# Patient Record
Sex: FEMALE | Race: WHITE
[De-identification: names, ages, dates, MRNs, and addresses within clinical notes are randomized per-mention and may not be internally consistent; named-entity substitution may affect disease eponyms.]

---

## 2019-01-18 ENCOUNTER — HOSPITAL ENCOUNTER (OUTPATIENT)
Dept: HOSPITAL 76 - EMS | Age: 69
Discharge: ANOTHER HOSPITAL | End: 2019-01-18
Attending: SURGERY
Payer: COMMERCIAL

## 2019-01-18 DIAGNOSIS — R10.9: Primary | ICD-10-CM

## 2019-03-27 ENCOUNTER — HOSPITAL ENCOUNTER (OUTPATIENT)
Dept: HOSPITAL 76 - DI.N | Age: 69
Discharge: HOME | End: 2019-03-27
Attending: FAMILY MEDICINE
Payer: COMMERCIAL

## 2019-03-27 DIAGNOSIS — R91.8: Primary | ICD-10-CM

## 2019-03-27 PROCEDURE — 71046 X-RAY EXAM CHEST 2 VIEWS: CPT

## 2019-03-27 NOTE — XRAY REPORT
Reason:  PULMONARY NODULES NOTED ON LAST CT

Procedure Date:  03/27/2019   

Accession Number:  [id number] / [id number]                    

Procedure:  XRN - Chest 2 View X-Ray CPT Code:  83412

 

FULL RESULT:

 

 

EXAM:

CHEST RADIOGRAPHY

 

EXAM DATE: 3/27/2019 10:51 AM.

 

CLINICAL HISTORY: Pulmonary nodules noted on last CT.

 

COMPARISON: None.

 

TECHNIQUE: 2 views.

 

FINDINGS:

Lungs/Pleura: No focal opacities evident. No pleural effusion. No 

pneumothorax. Normal volumes.

 

Mediastinum: Heart and mediastinal contours are unremarkable.

 

Other: None.

IMPRESSION: The nodules identified by CT are not definitely seen on plain 

radiograph.

 

Recommendation: General recommendations for nodules incidentally 

discovered on CT are as follows:

 

Fleischner Society Recommendations 2017

MacMahon et al. Radiology 2017

 

Solid Nodules-Low Risk Patients:

<6 mm (single or multiple) - No routine follow-up*

6-8 mm (single) -CT at 6-12 months, then consider CT at 18-24 months

6-8mm (multiple) -CT at 3-6 months, then consider at CT 18-24 months

>8 mm (single) -Consider CT, PET/CT, or tissue sampling at 3 months

>8 mm (multiple) -CT at 3-6 months, then consider CT at 18-24 months

 

Solid Nodules-High Risk Patients:

<6 mm (single or multiple) -Optional CT at 12 months*

6-8 mm (single) -CT at 6-12 months, then CT at 18-24 months

6-8mm (multiple) -CT at 3-6 months, then CT at 18-24 months

>8 mm (single) -Consider CT, PET/CT, or tissue sampling at 3 months

>8 mm (multiple) -CT at 3-6 months, then at 18-24 months

 

*Nodules < 6mm do not require routine follow-up, but suspicious nodule 

morphology, upper lobe location, or both may warrant 12 month follow-up

 

Subsolid nodules:

<6 mm (single, GG or part solid) -No routine follow-up**

>=6 mm (single GG) -CT at 6-12 months to confirm, then CT  q2 years until 

5 years

>=6 mm (single part solid) -CT at 3-6 months to confirm, if unchanged and 

solid <6mm, annual CT for 5 years

 

<6 mm (multiple GG or part solid) -CT at 3-6 months. If stable, consider 

CT at 2 and 4 years

>=6 mm (multiple GG or part solid) -CT at 3-6 months. Subsequent 

management based on most suspicious nodule(s).

 

**Consider follow-up at 2 and 4 years for certain suspicious nodules 

<6mm.  If solid component develops or growth, consider resection.

 

[facility]

## 2019-06-19 ENCOUNTER — HOSPITAL ENCOUNTER (OUTPATIENT)
Dept: HOSPITAL 76 - DI | Age: 69
Discharge: HOME | End: 2019-06-19
Attending: FAMILY MEDICINE
Payer: COMMERCIAL

## 2019-06-19 DIAGNOSIS — I35.1: ICD-10-CM

## 2019-06-19 DIAGNOSIS — I48.91: Primary | ICD-10-CM

## 2019-06-19 DIAGNOSIS — I51.7: ICD-10-CM

## 2019-06-19 PROCEDURE — 93306 TTE W/DOPPLER COMPLETE: CPT

## 2019-11-27 ENCOUNTER — HOSPITAL ENCOUNTER (OUTPATIENT)
Dept: HOSPITAL 76 - DI | Age: 69
Discharge: HOME | End: 2019-11-27
Attending: FAMILY MEDICINE
Payer: COMMERCIAL

## 2019-11-27 DIAGNOSIS — R19.7: ICD-10-CM

## 2019-11-27 DIAGNOSIS — R10.9: Primary | ICD-10-CM

## 2019-11-27 DIAGNOSIS — N28.1: ICD-10-CM

## 2019-11-27 PROCEDURE — 74176 CT ABD & PELVIS W/O CONTRAST: CPT

## 2019-11-28 NOTE — CT REPORT
Reason:  ABDOMINAL PAIN, DARRHEA

Procedure Date:  11/27/2019   

Accession Number:  [id number] / [id number]                    

Procedure:  CT  - Abdomen/Pelvis WO CPT Code:  

 

***Final Report***

 

 

FULL RESULT:

 

 

EXAM:

CT ABDOMEN AND PELVIS (CT KUB)

 

EXAM DATE: 11/27/2019 12:11 PM.

 

CLINICAL HISTORY: ABDOMINAL PAIN, DARRHEA.

 

COMPARISONS: None.

 

TECHNIQUE: Routine axial helical CT imaging was performed through the 

abdomen and pelvis without IV contrast. Reconstructions: Coronal and 

sagittal.

 

In accordance with CT protocol optimization, one or more of the following 

dose reduction techniques were utilized for this exam: automated exposure 

control, adjustment of mA and/or KV based on patient size, or use of 

iterative reconstructive technique.

 

FINDINGS:

Lung Bases: Unremarkable.

 

Right Kidney/Ureter: A 3 cm oval-shaped hypodense lesion in the inferior 

pole of right kidney, most consistent with simple cyst. No stones, 

hydronephrosis, or hydroureter. No perinephric fat stranding.

 

Left Kidney/Ureter: No stones, hydronephrosis, or hydroureter. No 

perinephric fat stranding.

 

Other Solid Organs: Noncontrast images of the solid organs are grossly 

unremarkable.

 

Gallbladder/Bile Ducts: Post cholecystectomy with clips in GB fossa.

 

Peritoneal Cavity: No free fluid, free air or mary adenopathy. Bowel is 

grossly unremarkable. Moderate colonic diverticulosis noted involving 

sigmoid colon. Appendix not well visualized.

 

Pelvic Organs: No bladder stones or wall thickening. Noncontrast images 

of the visualized pelvic organs are unremarkable.

 

Vasculature: Unremarkable.

 

Other: None.

IMPRESSION:

 

No urinary tract stones or obstruction. A 3 cm simple cyst inferior pole 

of right kidney. No other acute abdominal pathology.

 

[facility]

## 2020-02-19 ENCOUNTER — HOSPITAL ENCOUNTER (EMERGENCY)
Dept: HOSPITAL 76 - ED | Age: 70
Discharge: HOME | End: 2020-02-19
Payer: COMMERCIAL

## 2020-02-19 VITALS — DIASTOLIC BLOOD PRESSURE: 93 MMHG | SYSTOLIC BLOOD PRESSURE: 154 MMHG

## 2020-02-19 DIAGNOSIS — E87.70: ICD-10-CM

## 2020-02-19 DIAGNOSIS — I48.91: Primary | ICD-10-CM

## 2020-02-19 LAB
ALBUMIN DIAFP-MCNC: 4.1 G/DL (ref 3.2–5.5)
ALBUMIN/GLOB SERPL: 1.1 {RATIO} (ref 1–2.2)
ALP SERPL-CCNC: 96 IU/L (ref 42–121)
ALT SERPL W P-5'-P-CCNC: 29 IU/L (ref 10–60)
ANION GAP SERPL CALCULATED.4IONS-SCNC: 11 MMOL/L (ref 6–13)
AST SERPL W P-5'-P-CCNC: 26 IU/L (ref 10–42)
BASOPHILS NFR BLD AUTO: 0 10^3/UL (ref 0–0.1)
BASOPHILS NFR BLD AUTO: 0.4 %
BILIRUB BLD-MCNC: 1 MG/DL (ref 0.2–1)
BUN SERPL-MCNC: 14 MG/DL (ref 6–20)
CALCIUM UR-MCNC: 8.9 MG/DL (ref 8.5–10.3)
CHLORIDE SERPL-SCNC: 103 MMOL/L (ref 101–111)
CLARITY UR REFRACT.AUTO: CLEAR
CO2 SERPL-SCNC: 25 MMOL/L (ref 21–32)
CREAT SERPLBLD-SCNC: 1 MG/DL (ref 0.4–1)
EOSINOPHIL # BLD AUTO: 0.1 10^3/UL (ref 0–0.7)
EOSINOPHIL NFR BLD AUTO: 1.3 %
ERYTHROCYTE [DISTWIDTH] IN BLOOD BY AUTOMATED COUNT: 15 % (ref 12–15)
GFRSERPLBLD MDRD-ARVRAT: 55 ML/MIN/{1.73_M2} (ref 89–?)
GLOBULIN SER-MCNC: 3.7 G/DL (ref 2.1–4.2)
GLUCOSE SERPL-MCNC: 147 MG/DL (ref 70–100)
GLUCOSE UR QL STRIP.AUTO: NEGATIVE MG/DL
HGB UR QL STRIP: 10.8 G/DL (ref 12–16)
KETONES UR QL STRIP.AUTO: NEGATIVE MG/DL
LIPASE SERPL-CCNC: 23 U/L (ref 22–51)
LYMPHOCYTES # SPEC AUTO: 1.3 10^3/UL (ref 1.5–3.5)
LYMPHOCYTES NFR BLD AUTO: 13.8 %
MCH RBC QN AUTO: 28.6 PG (ref 27–31)
MCHC RBC AUTO-ENTMCNC: 30.7 G/DL (ref 32–36)
MCV RBC AUTO: 93.1 FL (ref 81–99)
MONOCYTES # BLD AUTO: 0.4 10^3/UL (ref 0–1)
MONOCYTES NFR BLD AUTO: 4.1 %
NEUTROPHILS # BLD AUTO: 7.4 10^3/UL (ref 1.5–6.6)
NEUTROPHILS # SNV AUTO: 9.3 X10^3/UL (ref 4.8–10.8)
NEUTROPHILS NFR BLD AUTO: 79.9 %
NITRITE UR QL STRIP.AUTO: NEGATIVE
PDW BLD AUTO: 10.2 FL (ref 7.9–10.8)
PH UR STRIP.AUTO: 6.5 PH (ref 5–7.5)
PLATELET # BLD: 374 10^3/UL (ref 130–450)
PROT SPEC-MCNC: 7.8 G/DL (ref 6.7–8.2)
PROT UR STRIP.AUTO-MCNC: 100 MG/DL
RBC # UR STRIP.AUTO: NEGATIVE /UL
RBC # URNS HPF: (no result) /HPF (ref 0–5)
RBC MAR: 3.78 10^6/UL (ref 4.2–5.4)
SODIUM SERPLBLD-SCNC: 139 MMOL/L (ref 135–145)
SP GR UR STRIP.AUTO: 1.02 (ref 1–1.03)
SQUAMOUS URNS QL MICRO: (no result)
UROBILINOGEN UR QL STRIP.AUTO: (no result) E.U./DL
UROBILINOGEN UR STRIP.AUTO-MCNC: NEGATIVE MG/DL

## 2020-02-19 PROCEDURE — 84484 ASSAY OF TROPONIN QUANT: CPT

## 2020-02-19 PROCEDURE — 93005 ELECTROCARDIOGRAM TRACING: CPT

## 2020-02-19 PROCEDURE — 96374 THER/PROPH/DIAG INJ IV PUSH: CPT

## 2020-02-19 PROCEDURE — 85025 COMPLETE CBC W/AUTO DIFF WBC: CPT

## 2020-02-19 PROCEDURE — 71045 X-RAY EXAM CHEST 1 VIEW: CPT

## 2020-02-19 PROCEDURE — 83690 ASSAY OF LIPASE: CPT

## 2020-02-19 PROCEDURE — 96376 TX/PRO/DX INJ SAME DRUG ADON: CPT

## 2020-02-19 PROCEDURE — 96375 TX/PRO/DX INJ NEW DRUG ADDON: CPT

## 2020-02-19 PROCEDURE — 99284 EMERGENCY DEPT VISIT MOD MDM: CPT

## 2020-02-19 PROCEDURE — 81003 URINALYSIS AUTO W/O SCOPE: CPT

## 2020-02-19 PROCEDURE — 81001 URINALYSIS AUTO W/SCOPE: CPT

## 2020-02-19 PROCEDURE — 83880 ASSAY OF NATRIURETIC PEPTIDE: CPT

## 2020-02-19 PROCEDURE — 80053 COMPREHEN METABOLIC PANEL: CPT

## 2020-02-19 PROCEDURE — 36415 COLL VENOUS BLD VENIPUNCTURE: CPT

## 2020-02-19 PROCEDURE — 87086 URINE CULTURE/COLONY COUNT: CPT

## 2020-02-19 NOTE — XRAY REPORT
Reason:  chest pain

Procedure Date:  02/19/2020   

Accession Number:  [id number] / [id number]                    

Procedure:  XR  - Chest 1 View X-Ray CPT Code:  93972

 

***Final Report***

 

 

FULL RESULT:

 

 

EXAM:

CHEST RADIOGRAPHY

 

EXAM DATE: 2/19/2020 05:41 AM.

 

CLINICAL HISTORY: Chest pain.

 

COMPARISON: CHEST 2 VIEW 03/27/2019 10:51 AM.

 

TECHNIQUE: 1 view.

 

FINDINGS:

Lungs/Pleura: Pulmonary vascular congestion. Small right and trace left 

pleural effusions. No pneumothorax.

 

Mediastinum: Within exam limitations, heart size is upper normal.

 

Other: None.

IMPRESSION:

1. Borderline heart size with pulmonary vascular congestion.

2. Small right and trace left pleural effusions.

 

[facility]

## 2020-02-19 NOTE — ED PHYSICIAN DOCUMENTATION
PD HPI CHEST PAIN





- Stated complaint


Stated Complaint: CP/SOA





- History obtained from


History obtained from: Patient





- History of Present Illness


Timing - onset: Enter  time (22:30), Last night


Timing - details: Gradual onset


Pain level now: 0


Quality: Pressure


Location: Substernal, Left chest


Radiation: Other (does not radiate)


Improved by: Nothing


Worsened by: No: Exertion, Inspiration, Eating, Movement, Palpation, Position


Associated symptoms: No: Shortness of air, Diaphoresis, Nausea, Vomiting, 

Feeling faint / dizzy, General Weakness, Palpitations, Cough


Recently seen: Not recently seen





Review of Systems


Constitutional: reports: Reviewed and negative


Cardiac: reports: Chest pain / pressure, Palpitations


Respiratory: reports: Reviewed and negative


GI: reports: Reviewed and negative


: denies: Dysuria, Frequency





PD PAST MEDICAL HISTORY





- Past Medical History


Past Medical History: Yes


Cardiovascular: Atrial fibrillation





- Present Medications


Home Medications: 


                                Ambulatory Orders











 Medication  Instructions  Recorded  Confirmed


 


Albuterol Sulf [Ventolin Hfa 2 puffs Q4H PRN 02/19/20 02/19/20





Inhaler]   


 


Atorvastatin Calcium [Lipitor] 1 tab DAILY 02/19/20 02/19/20


 


Beclomethasone 80 Mcg [Qvar 80] 2 puffs BID 02/19/20 02/19/20


 


Duloxetine HCl 60 mg PO DAILY 02/19/20 02/19/20


 


Famotidine 1 tab BID 02/19/20 02/19/20


 


Furosemide 1 tab DAILY 02/19/20 02/19/20


 


Furosemide [Lasix] 20 mg PO DAILY #30 tablet 02/19/20 


 


Gabapentin [Neurontin] 1 tab TID 02/19/20 02/19/20


 


Levothyroxine Sodium 1 tab DAILY 02/19/20 02/19/20


 


Lisinopril [Zestril] 1 tab BID 02/19/20 02/19/20


 


Magnesium Oxide 1 tab DAILY 02/19/20 02/19/20


 


Metoprolol Succinate 25 mg PO DAILY 02/19/20 02/19/20


 


Nitroglycerin 1 tab Q5M PRN 02/19/20 02/19/20


 


Potassium Chloride 10 meq PO DAILY PM #30 tablet.er 02/19/20 


 


Prochlorperazine Maleate 1 tab Q8HR PRN 02/19/20 02/19/20


 


Rivaroxaban [Xarelto] 1 tab DAILY 02/19/20 02/19/20


 


cloNIDine [Catapres] 1 tab BID 02/19/20 02/19/20


 


metFORMIN [Glucophage] 1 tab BID 02/19/20 02/19/20














- Allergies


Allergies/Adverse Reactions: 


                                    Allergies











Allergy/AdvReac Type Severity Reaction Status Date / Time


 


codeine Allergy  Unknown Verified 02/19/20 05:30


 


iodine Allergy  Unknown Verified 02/19/20 05:31














PD ED PE NORMAL





- Vitals


Vital signs reviewed: Yes





- General


General: Alert and oriented X 3, No acute distress, Well developed/nourished





- Neck


Neck: Supple, no meningeal sign





- Cardiac


Cardiac: No murmur





- Respiratory


Respiratory: No respiratory distress





- Abdomen


Abdomen: Soft, Non tender





- Derm


Derm: Normal color, Warm and dry





- Extremities


Extremities: No edema





PD ED PE EXPANDED





- Cardiac


Cardiac: Tachy, Irregularly irregular





Results





- Vitals


Vitals: 


                                     Oxygen











O2 Source                      Room air


 


Oxygen Flow Rate               2

















- EKG (time done)


  ** No standard instances


Rate: Rate (enter#) (138), Tachy


Rhythm: Atrial fibrillation


Axis: Normal


Ischemia: Normal ST segments





- Labs


Labs: 


                                Laboratory Tests











  02/19/20 02/19/20 02/19/20





  05:28 05:28 05:28


 


WBC  9.3  


 


RBC  3.78 L  


 


Hgb  10.8 L  


 


Hct  35.2 L  


 


MCV  93.1  


 


MCH  28.6  


 


MCHC  30.7 L  


 


RDW  15.0  


 


Plt Count  374  


 


MPV  10.2  


 


Neut # (Auto)  7.4 H  


 


Lymph # (Auto)  1.3 L  


 


Mono # (Auto)  0.4  


 


Eos # (Auto)  0.1  


 


Baso # (Auto)  0.0  


 


Absolute Nucleated RBC  0.00  


 


Nucleated RBC %  0.0  


 


Sodium   139 


 


Potassium   4.4 


 


Chloride   103 


 


Carbon Dioxide   25 


 


Anion Gap   11.0 


 


BUN   14 


 


Creatinine   1.0 


 


Estimated GFR (MDRD)   55 L 


 


Glucose   147 H 


 


Calcium   8.9 


 


Total Bilirubin   1.0 


 


AST   26 


 


ALT   29 


 


Alkaline Phosphatase   96 


 


Troponin I High Sens    15.2 H*


 


B-Natriuretic Peptide   


 


Total Protein   7.8 


 


Albumin   4.1 


 


Globulin   3.7 


 


Albumin/Globulin Ratio   1.1 


 


Lipase   23 


 


Urine Color   


 


Urine Clarity   


 


Urine pH   


 


Ur Specific Gravity   


 


Urine Protein   


 


Urine Glucose (UA)   


 


Urine Ketones   


 


Urine Occult Blood   


 


Urine Nitrite   


 


Urine Bilirubin   


 


Urine Urobilinogen   


 


Ur Leukocyte Esterase   


 


Urine RBC   


 


Urine WBC   


 


Ur Squamous Epith Cells   


 


Urine Bacteria   


 


Ur Microscopic Review   


 


Urine Culture Comments   














  02/19/20 02/19/20 02/19/20





  05:28 09:19 11:02


 


WBC   


 


RBC   


 


Hgb   


 


Hct   


 


MCV   


 


MCH   


 


MCHC   


 


RDW   


 


Plt Count   


 


MPV   


 


Neut # (Auto)   


 


Lymph # (Auto)   


 


Mono # (Auto)   


 


Eos # (Auto)   


 


Baso # (Auto)   


 


Absolute Nucleated RBC   


 


Nucleated RBC %   


 


Sodium   


 


Potassium   


 


Chloride   


 


Carbon Dioxide   


 


Anion Gap   


 


BUN   


 


Creatinine   


 


Estimated GFR (MDRD)   


 


Glucose   


 


Calcium   


 


Total Bilirubin   


 


AST   


 


ALT   


 


Alkaline Phosphatase   


 


Troponin I High Sens   13.9 


 


B-Natriuretic Peptide  730 H  


 


Total Protein   


 


Albumin   


 


Globulin   


 


Albumin/Globulin Ratio   


 


Lipase   


 


Urine Color    YELLOW


 


Urine Clarity    CLEAR


 


Urine pH    6.5


 


Ur Specific Gravity    1.025


 


Urine Protein    100 H


 


Urine Glucose (UA)    NEGATIVE


 


Urine Ketones    NEGATIVE


 


Urine Occult Blood    NEGATIVE


 


Urine Nitrite    NEGATIVE


 


Urine Bilirubin    NEGATIVE


 


Urine Urobilinogen    0.2 (NORMAL)


 


Ur Leukocyte Esterase    NEGATIVE


 


Urine RBC    0-5


 


Urine WBC    4-5


 


Ur Squamous Epith Cells    MOD Squamous H


 


Urine Bacteria    Rare


 


Ur Microscopic Review    INDICATED


 


Urine Culture Comments    NOT INDICATED














- Rads (name of study)


  ** chest xray


Radiology: Prelim report reviewed, See rad report





PD MEDICAL DECISION MAKING





- ED course


Complexity details: reviewed results, re-evaluated patient, considered 

differential, d/w patient


ED course: 





rate control achieved with 10 mg IV cardizem. d/w Dr. Campos, recommends 

increasing AM dose of metoprolol to 50 mg and maintain qpm dose of 25mg.





Departure





- Departure


Disposition: 01 Home, Self Care


Clinical Impression: 


 Atrial fibrillation with RVR, Volume overload state of heart





Condition: Stable


Instructions:  ED Afib, ED CHF General


Follow-Up: 


Griffin Ho DO [Primary Care Provider] - 


Prescriptions: 


Furosemide [Lasix] 20 mg PO DAILY #30 tablet


Potassium Chloride 10 meq PO DAILY PM #30 tablet.er


Comments: 


Today we are asking you to restart your Lasix at 20 mg/day and take this 5 da

ys/week if you have persistence of your shortness of breath or worsening of her 

shortness of breath get in and see Dr. Parmer right away.  While you are taking 

the Lasix take a potassium with each Lasix pill.


Discharge Date/Time: 02/19/20 14:11

## 2020-02-19 NOTE — ED PHYSICIAN DOCUMENTATION
PD HPI CHEST PAIN





- Stated complaint


Stated Complaint: CP/SOA





- Chief complaint


Chief Complaint: Cardiac





- History obtained from


History obtained from: Patient, Family





- History of Present Illness


Timing - onset: Last night


Timing - onset during: Rest


Timing - duration: Hours


Timing - details: Gradual onset, Still present


Quality: Pressure


Location: Left chest


Radiation: Back


Improved by: Rest, Oxygen


Worsened by: Exertion


Associated symptoms: Shortness of air, Feeling faint / dizzy.  No: Diaphoresis, 

Nausea, Vomiting


Similar symptoms before: No diagnosis


Recently seen: Clinic





- Additional information


Additional information: 





69-year-old female developed some chest pressure last night and she has begun to

develop increasing shortness of breath.  She is coming to the emergency 

department about 5 AM this morning with a rapid heart rate and shortness of 

breath.  Accompanied by chest pain.  Her chest pain has been present for more 

than 6 hours.





She states that her and her  had URI last week.He resolved his symptoms 

and she continued to get more and more short of breath.  She recalls late in the

visit that her cardiologist has taken her off of her Lasix about 2 weeks ago.She

now relates that she has become more and more short of breath since then.  This 

culminated this morning and she is come to the emergency department.





Review of Systems


Constitutional: reports: Myalgias.  denies: Fever


Eyes: denies: Decreased vision


Ears: denies: Ear pain


Nose: denies: Rhinorrhea / runny nose, Congestion


Throat: denies: Sore throat


Cardiac: reports: Chest pain / pressure, Palpitations.  denies: Pedal edema, 

Calf pain


Respiratory: reports: Dyspnea, Cough


GI: denies: Nausea, Vomiting


: denies: Dysuria





PD PAST MEDICAL HISTORY





- Past Medical History


Cardiovascular: Hypertension, High cholesterol, Atrial fibrillation


Respiratory: Asthma


Endocrine/Autoimmune: Type 2 diabetes


GI: GERD


Psych: Depression


Other Past Medical History: neuropathy





- Present Medications


Home Medications: 


                                Ambulatory Orders











 Medication  Instructions  Recorded  Confirmed


 


Albuterol Sulf [Ventolin Hfa 2 puffs Q4H PRN 02/19/20 02/19/20





Inhaler]   


 


Atorvastatin Calcium [Lipitor] 1 tab DAILY 02/19/20 02/19/20


 


Beclomethasone 80 Mcg [Qvar 80] 2 puffs BID 02/19/20 02/19/20


 


Duloxetine HCl 60 mg PO DAILY 02/19/20 02/19/20


 


Famotidine 1 tab BID 02/19/20 02/19/20


 


Furosemide 1 tab DAILY 02/19/20 02/19/20


 


Furosemide [Lasix] 20 mg PO DAILY #30 tablet 02/19/20 


 


Gabapentin [Neurontin] 1 tab TID 02/19/20 02/19/20


 


Levothyroxine Sodium 1 tab DAILY 02/19/20 02/19/20


 


Lisinopril [Zestril] 1 tab BID 02/19/20 02/19/20


 


Magnesium Oxide 1 tab DAILY 02/19/20 02/19/20


 


Metoprolol Succinate 25 mg PO DAILY 02/19/20 02/19/20


 


Nitroglycerin 1 tab Q5M PRN 02/19/20 02/19/20


 


Potassium Chloride 10 meq PO DAILY PM #30 tablet.er 02/19/20 


 


Prochlorperazine Maleate 1 tab Q8HR PRN 02/19/20 02/19/20


 


Rivaroxaban [Xarelto] 1 tab DAILY 02/19/20 02/19/20


 


cloNIDine [Catapres] 1 tab BID 02/19/20 02/19/20


 


metFORMIN [Glucophage] 1 tab BID 02/19/20 02/19/20














- Allergies


Allergies/Adverse Reactions: 


                                    Allergies











Allergy/AdvReac Type Severity Reaction Status Date / Time


 


codeine Allergy  Unknown Verified 02/19/20 05:30


 


iodine Allergy  Unknown Verified 02/19/20 05:31














- Social History


Does the pt smoke?: No


Smoking Status: Never smoker





PD ED PE NORMAL





- Vitals


Vital signs reviewed: Yes (Tachycardic and hypertensive)





- General


General: Alert and oriented X 3, Well developed/nourished, Other (Pale lips and 

tachypnea at rest with an expression of anxiety.)





- HEENT


HEENT: Atraumatic, PERRL, EOMI





- Cardiac


Cardiac: Other (Fairly acutely irregular rate and rhythm without murmur)





- Respiratory


Respiratory: No respiratory distress, Other (Diminished breath sounds 

bilaterally)





- Abdomen


Abdomen: Soft, Non tender





- Derm


Derm: Normal color, Warm and dry, No rash





- Extremities


Extremities: No deformity, No edema





- Neuro


Neuro: Alert and oriented X 3, CNs 2-12 intact, No motor deficit, No sensory 

deficit, Normal speech


Eye Opening: Spontaneous


Motor: Obeys Commands


Verbal: Oriented


GCS Score: 15





- Psych


Psych: Normal mood, Normal affect





Results





- Vitals


Vitals: 


                               Vital Signs - 24 hr











  02/19/20 02/19/20 02/19/20





  05:22 05:56 06:08


 


Temperature 36.5 C  


 


Heart Rate 139 H 76 76


 


Respiratory 18 27 H 19





Rate   


 


Blood Pressure 149/113 H 124/83 H 153/84 H


 


O2 Saturation 95 96 96














  02/19/20 02/19/20 02/19/20





  06:43 07:55 08:11


 


Temperature  36.7 C 


 


Heart Rate 82 86 103 H


 


Respiratory 18 24 17





Rate   


 


Blood Pressure 151/93 H 150/118 H 157/90 H


 


O2 Saturation 96 97 97














  02/19/20 02/19/20 02/19/20





  10:00 10:25 11:50


 


Temperature   


 


Heart Rate 94 50 L 57 L


 


Respiratory 17 16 18





Rate   


 


Blood Pressure 170/114 H 94/80 112/71


 


O2 Saturation 99 94 91 L














  02/19/20





  12:13


 


Temperature 


 


Heart Rate 66


 


Respiratory 18





Rate 


 


Blood Pressure 132/54 H


 


O2 Saturation 96








                                     Oxygen











O2 Source                      Room air


 


Oxygen Flow Rate               2

















- Labs


Labs: 


                                Laboratory Tests











  02/19/20 02/19/20 02/19/20





  05:28 05:28 05:28


 


WBC  9.3  


 


RBC  3.78 L  


 


Hgb  10.8 L  


 


Hct  35.2 L  


 


MCV  93.1  


 


MCH  28.6  


 


MCHC  30.7 L  


 


RDW  15.0  


 


Plt Count  374  


 


MPV  10.2  


 


Neut # (Auto)  7.4 H  


 


Lymph # (Auto)  1.3 L  


 


Mono # (Auto)  0.4  


 


Eos # (Auto)  0.1  


 


Baso # (Auto)  0.0  


 


Absolute Nucleated RBC  0.00  


 


Nucleated RBC %  0.0  


 


Sodium   139 


 


Potassium   4.4 


 


Chloride   103 


 


Carbon Dioxide   25 


 


Anion Gap   11.0 


 


BUN   14 


 


Creatinine   1.0 


 


Estimated GFR (MDRD)   55 L 


 


Glucose   147 H 


 


Calcium   8.9 


 


Total Bilirubin   1.0 


 


AST   26 


 


ALT   29 


 


Alkaline Phosphatase   96 


 


Troponin I High Sens    15.2 H*


 


B-Natriuretic Peptide   


 


Total Protein   7.8 


 


Albumin   4.1 


 


Globulin   3.7 


 


Albumin/Globulin Ratio   1.1 


 


Lipase   23 


 


Urine Color   


 


Urine Clarity   


 


Urine pH   


 


Ur Specific Gravity   


 


Urine Protein   


 


Urine Glucose (UA)   


 


Urine Ketones   


 


Urine Occult Blood   


 


Urine Nitrite   


 


Urine Bilirubin   


 


Urine Urobilinogen   


 


Ur Leukocyte Esterase   


 


Urine RBC   


 


Urine WBC   


 


Ur Squamous Epith Cells   


 


Urine Bacteria   


 


Ur Microscopic Review   


 


Urine Culture Comments   














  02/19/20 02/19/20 02/19/20





  05:28 09:19 11:02


 


WBC   


 


RBC   


 


Hgb   


 


Hct   


 


MCV   


 


MCH   


 


MCHC   


 


RDW   


 


Plt Count   


 


MPV   


 


Neut # (Auto)   


 


Lymph # (Auto)   


 


Mono # (Auto)   


 


Eos # (Auto)   


 


Baso # (Auto)   


 


Absolute Nucleated RBC   


 


Nucleated RBC %   


 


Sodium   


 


Potassium   


 


Chloride   


 


Carbon Dioxide   


 


Anion Gap   


 


BUN   


 


Creatinine   


 


Estimated GFR (MDRD)   


 


Glucose   


 


Calcium   


 


Total Bilirubin   


 


AST   


 


ALT   


 


Alkaline Phosphatase   


 


Troponin I High Sens   13.9 


 


B-Natriuretic Peptide  730 H  


 


Total Protein   


 


Albumin   


 


Globulin   


 


Albumin/Globulin Ratio   


 


Lipase   


 


Urine Color    YELLOW


 


Urine Clarity    CLEAR


 


Urine pH    6.5


 


Ur Specific Gravity    1.025


 


Urine Protein    100 H


 


Urine Glucose (UA)    NEGATIVE


 


Urine Ketones    NEGATIVE


 


Urine Occult Blood    NEGATIVE


 


Urine Nitrite    NEGATIVE


 


Urine Bilirubin    NEGATIVE


 


Urine Urobilinogen    0.2 (NORMAL)


 


Ur Leukocyte Esterase    NEGATIVE


 


Urine RBC    0-5


 


Urine WBC    4-5


 


Ur Squamous Epith Cells    MOD Squamous H


 


Urine Bacteria    Rare


 


Ur Microscopic Review    INDICATED


 


Urine Culture Comments    NOT INDICATED














- Rads (name of study)


  ** chest


Radiology: Prelim report reviewed (Impression: 1.  Borderline heart size with 

pulmonary vascular congestion. 2. Small right and trace left pleural 

effusions.), EMP read indepedently, See rad report





PD MEDICAL DECISION MAKING





- ED course


Complexity details: reviewed old records, reviewed results, re-evaluated 

patient, considered differential, d/w patient, d/w family


ED course: 





69-year-old female with a history of atrial fibrillation has recently stopped 

her Lasix and is now more short of breath and has chest pain.  She is found to 

be in atrial fibrillation with a rapid ventricular response and she is given a 

dose of diltiazem 10 mg intravenously which slows her heart rate down to about 

105.  She continues to have symptoms of shortness of breath and she is given a 

dose of metoprolol at the direction of her cardiologist.  She still has 

tachycardia and dyspnea and she is given a second dose of diltiazem at this time

20 mg and has a rapid reduction in her heart rate into the 40s.  She does have 

some long sinus pauses as well with this.  She is found to be volume overloaded 

and a dose of Lasix is given intravenously.  She has marked improvement in her 

ability to breathe and her heart rate and blood pressure come into normal range.

 She feels much improved.  I come come back into her room she is now sitting up 

smiling and laughing.  I have called Dr. Parmer back again reviewed her case 

with him and he recommends restarting her Lasix at 20 mg/day 5 days/week and 

have her follow-up with him.





Departure





- Departure


Disposition: 01 Home, Self Care


Clinical Impression: 


 Atrial fibrillation with RVR, Volume overload state of heart





Condition: Stable


Instructions:  ED Afib, ED CHF General


Follow-Up: 


Griffin Ho DO [Primary Care Provider] - 


Prescriptions: 


Furosemide [Lasix] 20 mg PO DAILY #30 tablet


Potassium Chloride 10 meq PO DAILY PM #30 tablet.er


Comments: 


Today we are asking you to restart your Lasix at 20 mg/day and take this 5 

days/week if you have persistence of your shortness of breath or worsening of 

her shortness of breath get in and see Dr. Parmer right away.  While you are 

taking the Lasix take a potassium with each Lasix pill.

## 2020-02-24 ENCOUNTER — HOSPITAL ENCOUNTER (EMERGENCY)
Dept: HOSPITAL 76 - ED | Age: 70
Discharge: ANOTHER HOSPITAL | End: 2020-02-24
Payer: COMMERCIAL

## 2020-02-24 ENCOUNTER — HOSPITAL ENCOUNTER (OUTPATIENT)
Dept: HOSPITAL 76 - EMS | Age: 70
Discharge: ANOTHER HOSPITAL | End: 2020-02-24
Attending: SURGERY
Payer: COMMERCIAL

## 2020-02-24 ENCOUNTER — HOSPITAL ENCOUNTER (OUTPATIENT)
Dept: HOSPITAL 76 - EMS | Age: 70
Discharge: ANOTHER HOSPITAL | End: 2020-02-24
Attending: SURGERY
Payer: COMMERCIAL

## 2020-02-24 VITALS — SYSTOLIC BLOOD PRESSURE: 145 MMHG | DIASTOLIC BLOOD PRESSURE: 94 MMHG

## 2020-02-24 DIAGNOSIS — E11.9: ICD-10-CM

## 2020-02-24 DIAGNOSIS — R07.9: Primary | ICD-10-CM

## 2020-02-24 DIAGNOSIS — I48.91: ICD-10-CM

## 2020-02-24 DIAGNOSIS — I21.4: Primary | ICD-10-CM

## 2020-02-24 DIAGNOSIS — Z79.84: ICD-10-CM

## 2020-02-24 DIAGNOSIS — Z79.01: ICD-10-CM

## 2020-02-24 DIAGNOSIS — R06.02: ICD-10-CM

## 2020-02-24 LAB
ALBUMIN DIAFP-MCNC: 3.7 G/DL (ref 3.2–5.5)
ALBUMIN/GLOB SERPL: 1.1 {RATIO} (ref 1–2.2)
ALP SERPL-CCNC: 76 IU/L (ref 42–121)
ALT SERPL W P-5'-P-CCNC: 20 IU/L (ref 10–60)
ANION GAP SERPL CALCULATED.4IONS-SCNC: 10 MMOL/L (ref 6–13)
AST SERPL W P-5'-P-CCNC: 18 IU/L (ref 10–42)
BASOPHILS NFR BLD AUTO: 0 10^3/UL (ref 0–0.1)
BASOPHILS NFR BLD AUTO: 0.5 %
BILIRUB BLD-MCNC: 1.1 MG/DL (ref 0.2–1)
BUN SERPL-MCNC: 11 MG/DL (ref 6–20)
CALCIUM UR-MCNC: 8.9 MG/DL (ref 8.5–10.3)
CHLORIDE SERPL-SCNC: 104 MMOL/L (ref 101–111)
CO2 SERPL-SCNC: 24 MMOL/L (ref 21–32)
CREAT SERPLBLD-SCNC: 0.9 MG/DL (ref 0.4–1)
EOSINOPHIL # BLD AUTO: 0.2 10^3/UL (ref 0–0.7)
EOSINOPHIL NFR BLD AUTO: 2.4 %
ERYTHROCYTE [DISTWIDTH] IN BLOOD BY AUTOMATED COUNT: 15.3 % (ref 12–15)
GFRSERPLBLD MDRD-ARVRAT: 62 ML/MIN/{1.73_M2} (ref 89–?)
GLOBULIN SER-MCNC: 3.5 G/DL (ref 2.1–4.2)
GLUCOSE SERPL-MCNC: 136 MG/DL (ref 70–100)
HGB UR QL STRIP: 9.9 G/DL (ref 12–16)
INR PPP: 2.2 (ref 0.8–1.2)
LIPASE SERPL-CCNC: 19 U/L (ref 22–51)
LYMPHOCYTES # SPEC AUTO: 1 10^3/UL (ref 1.5–3.5)
LYMPHOCYTES NFR BLD AUTO: 16.9 %
MCH RBC QN AUTO: 29.3 PG (ref 27–31)
MCHC RBC AUTO-ENTMCNC: 31.3 G/DL (ref 32–36)
MCV RBC AUTO: 93.5 FL (ref 81–99)
MONOCYTES # BLD AUTO: 0.3 10^3/UL (ref 0–1)
MONOCYTES NFR BLD AUTO: 4.7 %
NEUTROPHILS # BLD AUTO: 4.6 10^3/UL (ref 1.5–6.6)
NEUTROPHILS # SNV AUTO: 6.2 X10^3/UL (ref 4.8–10.8)
NEUTROPHILS NFR BLD AUTO: 75.2 %
PDW BLD AUTO: 10 FL (ref 7.9–10.8)
PLATELET # BLD: 273 10^3/UL (ref 130–450)
PROT SPEC-MCNC: 7.2 G/DL (ref 6.7–8.2)
PROTHROM ACT/NOR PPP: 23.9 SECS (ref 9.9–12.6)
RBC MAR: 3.38 10^6/UL (ref 4.2–5.4)
SODIUM SERPLBLD-SCNC: 138 MMOL/L (ref 135–145)

## 2020-02-24 PROCEDURE — 85025 COMPLETE CBC W/AUTO DIFF WBC: CPT

## 2020-02-24 PROCEDURE — 99285 EMERGENCY DEPT VISIT HI MDM: CPT

## 2020-02-24 PROCEDURE — 84484 ASSAY OF TROPONIN QUANT: CPT

## 2020-02-24 PROCEDURE — 36415 COLL VENOUS BLD VENIPUNCTURE: CPT

## 2020-02-24 PROCEDURE — 93005 ELECTROCARDIOGRAM TRACING: CPT

## 2020-02-24 PROCEDURE — 83690 ASSAY OF LIPASE: CPT

## 2020-02-24 PROCEDURE — 80053 COMPREHEN METABOLIC PANEL: CPT

## 2020-02-24 PROCEDURE — 85610 PROTHROMBIN TIME: CPT

## 2020-02-24 PROCEDURE — 83880 ASSAY OF NATRIURETIC PEPTIDE: CPT

## 2020-02-24 PROCEDURE — 71045 X-RAY EXAM CHEST 1 VIEW: CPT

## 2020-02-24 PROCEDURE — 99284 EMERGENCY DEPT VISIT MOD MDM: CPT

## 2020-02-24 NOTE — ED PHYSICIAN DOCUMENTATION
PD HPI CHEST PAIN





- Stated complaint


Stated Complaint: CP/SOA





- Chief complaint


Chief Complaint: Cardiac





- History obtained from


History obtained from: Patient, Family





- History of Present Illness


Timing - onset: Today, Other (3AM)


Timing - details: Abrupt onset


Pain level max: 6


Pain level now: 0


Quality: Sharp


Location: Substernal


Improved by: Nothing


Associated symptoms: Shortness of air, Palpitations


Similar symptoms before: Diagnosis (Atrial fibrillation with rapid ventricular 

response)





- Additional information


Additional information: 





69-year-old female with history of chronic atrial fibrillation on metoprolol and

Xarelto, who presented to the emergency department because of acute onset of 

chest discomfort, shortness of breath and palpitations.  She reported that this 

pain started at 3 AM and it woke her up. When EMS arrived, patient was found to 

be in atrial fibrillation with rapid ventricular response at a rate of 150.  

Patient was given 18 mg of diltiazem and her heart rate improved.  Currently her

heart rates are in the 60s to 80s.  Patient remained in atrial fibrillation but 

currently, patient denies any chest pain or shortness of breath or palpitations.





Patient was seen in the emergency department for similar complaints on February 19, 2020.  After discussion with Dr. Campos, Patient's cardiologist.  Patient 

was instructed to take metoprolol 50 mg in the morning and 25 mg at night.





Patient had an echocardiogram in 6/2019 that showed normal EF (60-65%) with no 

regional wall abnormality, Moderate LA dilation, normal systolic function, no no

pulmonary hypertension. 





Review of Systems


Constitutional: denies: Fever, Chills


Eyes: denies: Loss of vision


Ears: denies: Loss of hearing, Ear pain


Nose: denies: Rhinorrhea / runny nose, Congestion


Cardiac: reports: Palpitations.  denies: Chest pain / pressure


Respiratory: reports: Dyspnea


GI: denies: Abdominal Pain, Abdominal Swelling


Musculoskeletal: denies: Neck pain, Back pain, Extremity pain, Joint pain


Neurologic: denies: Generalized weakness, Focal weakness, Numbness, Difficulty 

speaking





PD PAST MEDICAL HISTORY





- Past Medical History


Cardiovascular: Atrial fibrillation


Respiratory: Asthma


Endocrine/Autoimmune: Type 2 diabetes


GI: GERD


Psych: Depression





- Present Medications


Home Medications: 


                                Ambulatory Orders











 Medication  Instructions  Recorded  Confirmed


 


Albuterol Sulf [Ventolin Hfa 2 puffs Q4H PRN 02/19/20 02/19/20





Inhaler]   


 


Atorvastatin Calcium [Lipitor] 1 tab DAILY 02/19/20 02/19/20


 


Beclomethasone 80 Mcg [Qvar 80] 2 puffs BID 02/19/20 02/19/20


 


Duloxetine HCl 60 mg PO DAILY 02/19/20 02/19/20


 


Famotidine 1 tab BID 02/19/20 02/19/20


 


Furosemide 1 tab DAILY 02/19/20 02/19/20


 


Furosemide [Lasix] 20 mg PO DAILY #30 tablet 02/19/20 


 


Gabapentin [Neurontin] 1 tab TID 02/19/20 02/19/20


 


Levothyroxine Sodium 1 tab DAILY 02/19/20 02/19/20


 


Lisinopril [Zestril] 1 tab BID 02/19/20 02/19/20


 


Magnesium Oxide 1 tab DAILY 02/19/20 02/19/20


 


Metoprolol Succinate 25 mg PO DAILY 02/19/20 02/19/20


 


Nitroglycerin 1 tab Q5M PRN 02/19/20 02/19/20


 


Potassium Chloride 10 meq PO DAILY PM #30 tablet.er 02/19/20 


 


Prochlorperazine Maleate 1 tab Q8HR PRN 02/19/20 02/19/20


 


Rivaroxaban [Xarelto] 1 tab DAILY 02/19/20 02/19/20


 


cloNIDine [Catapres] 1 tab BID 02/19/20 02/19/20


 


metFORMIN [Glucophage] 1 tab BID 02/19/20 02/19/20














- Allergies


Allergies/Adverse Reactions: 


                                    Allergies











Allergy/AdvReac Type Severity Reaction Status Date / Time


 


codeine Allergy  Unknown Verified 02/19/20 05:30


 


iodine Allergy  Unknown Verified 02/19/20 05:31














- Social History


Does the pt smoke?: No


Smoking Status: Never smoker





PD ED PE NORMAL





- Vitals


Vital signs reviewed: Yes





- General


General: Alert and oriented X 3





- HEENT


HEENT: Atraumatic





- Neck


Neck: Supple, no meningeal sign





- Cardiac


Cardiac: Other (irregularly irregular)





- Respiratory


Respiratory: No respiratory distress, Clear bilaterally





- Abdomen


Abdomen: Normal bowel sounds





- Back


Back: No CVA TTP





- Derm


Derm: Normal color





- Extremities


Extremities: No deformity, No tenderness to palpate, Normal ROM s pain, No 

edema, No calf tenderness / cord





- Neuro


Neuro: Alert and oriented X 3, CNs 2-12 intact, No motor deficit, No sensory 

deficit, Normal speech


Eye Opening: Spontaneous


Motor: Obeys Commands


Verbal: Oriented


GCS Score: 15





Results





- Vitals


Vitals: 


                               Vital Signs - 24 hr











  02/24/20 02/24/20 02/24/20





  03:40 04:22 05:16


 


Temperature 36.4 C L  


 


Heart Rate 67 71 66


 


Respiratory 15 14 18





Rate   


 


Blood Pressure 128/67 134/82 H 140/78 H


 


O2 Saturation 94 93 94














  02/24/20 02/24/20 02/24/20





  05:30 06:02 08:00


 


Temperature   


 


Heart Rate 70 68 86


 


Respiratory 16 18 16





Rate   


 


Blood Pressure 109/76 132/93 H 139/111 H


 


O2 Saturation 80 L 98 97














  02/24/20





  08:43


 


Temperature 36.4 C L


 


Heart Rate 80


 


Respiratory 16





Rate 


 


Blood Pressure 145/94 H


 


O2 Saturation 99








                                     Oxygen











O2 Source                      Nasal cannula


 


Oxygen Flow Rate               2

















- EKG (time done)


  ** 0348


Rate: Rate (enter#) (67)


Rhythm: Atrial fibrillation


Intervals: Prolonged QT


QRS: Normal


Ischemia: Non specific changes


Compare to prior EKG: Unchanged from prior EKG





  ** 0537


Rate: Rate (enter#) (73)


Rhythm: Atrial fibrillation


Axis: Normal


QRS: Normal


Ischemia: Non specific changes


Compare to prior EKG: Unchanged from prior EKG





- Labs


Labs: 


                                Laboratory Tests











  02/24/20 02/24/20 02/24/20





  03:55 03:55 03:55


 


WBC  6.2  


 


RBC  3.38 L  


 


Hgb  9.9 L  


 


Hct  31.6 L  


 


MCV  93.5  


 


MCH  29.3  


 


MCHC  31.3 L  


 


RDW  15.3 H  


 


Plt Count  273  


 


MPV  10.0  


 


Neut # (Auto)  4.6  


 


Lymph # (Auto)  1.0 L  


 


Mono # (Auto)  0.3  


 


Eos # (Auto)  0.2  


 


Baso # (Auto)  0.0  


 


Absolute Nucleated RBC  0.00  


 


Nucleated RBC %  0.0  


 


PT   23.9 H 


 


INR   2.2 H 


 


Sodium    138


 


Potassium    4.0


 


Chloride    104


 


Carbon Dioxide    24


 


Anion Gap    10.0


 


BUN    11


 


Creatinine    0.9


 


Estimated GFR (MDRD)    62 L


 


Glucose    136 H


 


Calcium    8.9


 


Total Bilirubin    1.1 H


 


AST    18


 


ALT    20


 


Alkaline Phosphatase    76


 


Troponin I High Sens   


 


B-Natriuretic Peptide   


 


Total Protein    7.2


 


Albumin    3.7


 


Globulin    3.5


 


Albumin/Globulin Ratio    1.1


 


Lipase    19 L














  02/24/20 02/24/20 02/24/20





  03:55 03:55 06:00


 


WBC   


 


RBC   


 


Hgb   


 


Hct   


 


MCV   


 


MCH   


 


MCHC   


 


RDW   


 


Plt Count   


 


MPV   


 


Neut # (Auto)   


 


Lymph # (Auto)   


 


Mono # (Auto)   


 


Eos # (Auto)   


 


Baso # (Auto)   


 


Absolute Nucleated RBC   


 


Nucleated RBC %   


 


PT   


 


INR   


 


Sodium   


 


Potassium   


 


Chloride   


 


Carbon Dioxide   


 


Anion Gap   


 


BUN   


 


Creatinine   


 


Estimated GFR (MDRD)   


 


Glucose   


 


Calcium   


 


Total Bilirubin   


 


AST   


 


ALT   


 


Alkaline Phosphatase   


 


Troponin I High Sens  23.1 H*   59.9 H*


 


B-Natriuretic Peptide   424 H 


 


Total Protein   


 


Albumin   


 


Globulin   


 


Albumin/Globulin Ratio   


 


Lipase   














PD MEDICAL DECISION MAKING





- ED course


Complexity details: d/w consultant


ED course: 





69 year old female presents to the emergency department because of acute onset 

of chest pain. She was found to be in atrial fibrillation with rapid ventricular

 response.  Patient was given Cardizem with improvement.  Patient was given 

metoprolol PO.  Her heart rate remained less than 100.  She had a brief episode 

of chest pain and repeat EKG showed atrial fibrillation without dynamic changes.

  2nd troponin was elevated when compared to the first.  With intermittent chest

 pains, case was discussed with Dr. Nielsen, on call cardiologist for Dr. Campos

 and he recommended the patient to be transferred to [facility] to 

be admitted to the hospitalist's service and cardiology will consult.  Spoke 

with Dr. Nelson, hospitalist and he has accepted the patient for transfer.  





Departure





- Departure


Disposition: 02 Transfer Acute Care Hosp


Clinical Impression: 


 Chest pain, Atrial fibrillation with RVR, NSTEMI (non-ST elevated myocardial 

infarction)





Condition: Stable

## 2020-02-24 NOTE — XRAY REPORT
Reason:  Chest Pain

Procedure Date:  02/24/2020   

Accession Number:  [id number] / [id number]                    

Procedure:  XR  - Chest 1 View X-Ray CPT Code:  08519

 

***Final Report***

 

 

FULL RESULT:

 

 

EXAM:

CHEST RADIOGRAPHY

 

EXAM DATE: 2/24/2020 03:59 AM.

 

CLINICAL HISTORY: Chest Pain.

 

COMPARISON: CHEST 1 VIEW 02/19/2020 5:27 AM.

 

TECHNIQUE: 1 view.

 

FINDINGS:

The mediastinal silhouette is normal. The heart is borderline enlarged. 

There is mild pulmonary vascular congestion, unchanged. No focal 

consolidation, pleural effusion, or pneumothorax is seen. The osseous 

thorax is intact.

IMPRESSION: Unchanged mild pulmonary vascular congestion.

Borderline cardiomegaly.

 

[facility]

## 2021-05-28 ENCOUNTER — HOSPITAL ENCOUNTER (OUTPATIENT)
Dept: HOSPITAL 76 - DI | Age: 71
Discharge: HOME | End: 2021-05-28
Attending: FAMILY MEDICINE
Payer: COMMERCIAL

## 2021-05-28 DIAGNOSIS — J84.10: ICD-10-CM

## 2021-05-28 DIAGNOSIS — M54.5: ICD-10-CM

## 2021-05-28 DIAGNOSIS — K59.00: Primary | ICD-10-CM

## 2021-05-28 DIAGNOSIS — R10.30: ICD-10-CM

## 2021-05-28 DIAGNOSIS — K57.30: ICD-10-CM

## 2021-05-28 DIAGNOSIS — N28.1: ICD-10-CM

## 2021-05-28 DIAGNOSIS — R10.13: ICD-10-CM

## 2021-05-28 LAB
CREAT SERPLBLD-SCNC: 1.3 MG/DL (ref 0.4–1)
GFRSERPLBLD MDRD-ARVRAT: 41 ML/MIN/{1.73_M2} (ref 89–?)

## 2021-05-28 PROCEDURE — 74177 CT ABD & PELVIS W/CONTRAST: CPT

## 2021-05-28 PROCEDURE — 36415 COLL VENOUS BLD VENIPUNCTURE: CPT

## 2021-05-28 PROCEDURE — 82565 ASSAY OF CREATININE: CPT

## 2021-05-28 NOTE — CT REPORT
PROCEDURE:  Abdomen/Pelvis W

 

INDICATIONS:  EPIGASTRIC AND LOWER ABD PAIN

 

CONTRAST:  IV CONTRAST: Optiray 320 ml: 100 PO CONTRAST: Isovue 300 ml50

 

TECHNIQUE:  

After the administration of IV and oral contrast, 5 mm thick sections acquired from the diaphragms to
 the symphysis.  5 mm thick coronal and sagittal reformats were acquired.  For radiation dose reducti
on, the following was used:  automated exposure control, adjustment of mA and/or kV according to scottie
ent size.  

 

COMPARISON:  11/27/2019.

 

FINDINGS:  

Image quality:  Excellent.  

 

ABDOMEN:  

Lung bases:  6 mm calcified granuloma in posterior aspect of right lung base is seen. Lung bases are 
otherwise clear. Heart size is normal.  

 

Solid organs:  Liver and spleen are normal in size and enhancement.  Gallbladder is surgically absent
.  Biliary system is non dilated.  Pancreas enhances normally.  No adrenal nodules.  Kidneys demonstr
ate normal size and enhancement, without hydronephrosis.  3.2 x 2.6 cm lower pole right renal cyst is
 seen.

 

Peritoneum and bowel:  Bowel loops demonstrate normal wall thickness and caliber.  No free fluid or a
ir.  Fecal stasis throughout the colon is seen. Sigmoid diverticulosis is noted, no CT evidence of ac
rosetta diverticulitis. Appendix is not visualized.

 

Nodes and vessels:  No retroperitoneal or mesenteric adenopathy by size criteria.  Aorta and inferior
 vena cava are normal in size.  

Miscellaneous:  No ventral hernias.  

 

 

PELVIS:  

Genitourinary:  Bladder wall thickness is normal.  

 

Miscellaneous:  No inguinal hernias or adenopathy.  

 

Bones:  No suspicious bony lesions.  No vertebral body compression fractures.  Degenerative disc dise
ase in mid to lower lumbar spine is seen.

 

 

IMPRESSION:  

1. Mild constipation. No bowel obstruction or abnormal bowel wall thickening. No free fluid or free a
ir. Sigmoid diverticulosis without evidence of acute diverticulitis.

2. Right renal cyst as above, not significantly changed from prior study. No renal stone or hydroneph
rosis.

3. Stable calcified granuloma at right lung base.

 

Reviewed by: Aneudy Gr MD on 5/28/2021 1:38 PM PDT

Approved by: Aneudy Gr MD on 5/28/2021 1:38 PM PDT

 

 

Station ID:  [id number]

## 2023-07-23 ENCOUNTER — HOSPITAL ENCOUNTER (OUTPATIENT)
Dept: HOSPITAL 76 - EMS | Age: 73
Discharge: ANOTHER HOSPITAL | End: 2023-07-23
Payer: COMMERCIAL

## 2023-07-23 DIAGNOSIS — R39.198: ICD-10-CM

## 2023-07-23 DIAGNOSIS — R53.1: ICD-10-CM

## 2023-07-23 DIAGNOSIS — R53.83: ICD-10-CM

## 2023-07-23 DIAGNOSIS — R06.2: ICD-10-CM

## 2023-07-23 DIAGNOSIS — R41.0: Primary | ICD-10-CM

## 2023-07-23 DIAGNOSIS — R05.9: ICD-10-CM

## 2023-07-23 DIAGNOSIS — R06.02: ICD-10-CM

## 2023-08-09 ENCOUNTER — HOSPITAL ENCOUNTER (OUTPATIENT)
Dept: HOSPITAL 76 - EMS | Age: 73
Discharge: ANOTHER HOSPITAL | End: 2023-08-09
Payer: COMMERCIAL

## 2023-08-09 DIAGNOSIS — R06.89: ICD-10-CM

## 2023-08-09 DIAGNOSIS — R25.1: ICD-10-CM

## 2023-08-09 DIAGNOSIS — R41.82: Primary | ICD-10-CM

## 2023-08-09 DIAGNOSIS — R09.02: ICD-10-CM
